# Patient Record
Sex: MALE | Race: WHITE
[De-identification: names, ages, dates, MRNs, and addresses within clinical notes are randomized per-mention and may not be internally consistent; named-entity substitution may affect disease eponyms.]

---

## 2018-11-05 ENCOUNTER — HOSPITAL ENCOUNTER (EMERGENCY)
Dept: HOSPITAL 62 - ER | Age: 33
Discharge: LEFT BEFORE DISCHARGE | End: 2018-11-05
Payer: SELF-PAY

## 2018-11-05 DIAGNOSIS — Z53.21: Primary | ICD-10-CM

## 2018-11-05 DIAGNOSIS — R07.81: ICD-10-CM

## 2018-11-07 ENCOUNTER — HOSPITAL ENCOUNTER (EMERGENCY)
Dept: HOSPITAL 62 - ER | Age: 33
Discharge: HOME | End: 2018-11-07
Payer: SELF-PAY

## 2018-11-07 VITALS — DIASTOLIC BLOOD PRESSURE: 68 MMHG | SYSTOLIC BLOOD PRESSURE: 110 MMHG

## 2018-11-07 DIAGNOSIS — R09.89: ICD-10-CM
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
## 2018-11-07 DIAGNOSIS — Z88.0: ICD-10-CM

## 2018-11-07 DIAGNOSIS — Z88.5: ICD-10-CM

## 2018-11-07 DIAGNOSIS — R07.81: ICD-10-CM

## 2018-11-07 DIAGNOSIS — F17.210: ICD-10-CM

## 2018-11-07 DIAGNOSIS — S20.212A: Primary | ICD-10-CM

## 2018-11-07 PROCEDURE — 99283 EMERGENCY DEPT VISIT LOW MDM: CPT

## 2018-11-07 NOTE — RADIOLOGY REPORT (SQ)
EXAM DESCRIPTION:  RIBS LEFT W/PA CHEST



COMPLETED DATE/TIME:  11/7/2018 10:50 am



REASON FOR STUDY:  Left rib injury



COMPARISON:  Chest x-ray dated 5/18/2016



TECHNIQUE:  Frontal view of the chest and additional views of the left ribs acquired.



NUMBER OF VIEWS:  Five view.



LIMITATIONS:  None.



FINDINGS:  FRONTAL CXR: No pneumothorax.  No pleural effusion.  No atelectasis or infiltrates.

RIBS: No displaced rib fractures.  No lytic or blastic bony lesions.

OTHER: No other significant finding.



IMPRESSION:  NO PNEUMOTHORAX.  NO DISPLACED RIB FRACTURES.



COMMENT:  SITE OF TRAUMA/COMPLAINT MARKED/STAMP COMPLETED: No



TECHNICAL DOCUMENTATION:  JOB ID:  [id number]

 2011 [employer]- All Rights Reserved



Reading location - IP/workstation name: [email or web address]

## 2018-11-07 NOTE — ER DOCUMENT REPORT
ED General





<JOE AGLLEGOS - Last Filed: 11/07/18 11:10>





- General


Mode of Arrival: Ambulatory


Information source: Patient


TRAVEL OUTSIDE OF THE [place] IN LAST 30 DAYS: No





<KAY SAENZ - Last Filed: 11/07/18 16:32>





- General


Chief Complaint: Rib Pain


Stated Complaint: RIB PAIN


Time Seen by Provider: 11/07/18 10:02


Notes: 


Patient is a 33 year old male with epilepsy presents to the emergency 

department complaining of left rib pain onset 3 days ago. Patient states he was 

playing soccer 3 nights ago when he collided with a  goalie and proceeded to 

have left rib pain. Patient states he presented to the emergency department 2 

days ago complaining of the same pain but left without being seen. He states 

the pain has significantly worsened this morning and is exacerbated with deep 

breathing. Patient states he has been taking Ibuprofen 800mg every 4-6 hours. 


 (KAY SAENZ)





- Related Data


Allergies/Adverse Reactions: 


 





amoxicillin [Amoxicillin] Allergy (Severe, Verified 11/07/18 09:37)


 


morphine [Morphine] Allergy (Severe, Verified 11/07/18 09:37)


 











Past Medical History





- General


Information source: Patient





- Social History


Smoking Status: Current Every Day Smoker


Cigarette use (# per day): Yes - 1/2 PPD


Chew tobacco use (# tins/day): No


Smoking Education Provided: No


Frequency of alcohol use: Rare


Family History: CAD, CVA, DM, Hyperlipidemia, Hypertension, Thyroid Disfunction


Neurological Medical History: Reports: Hx Seizures


Past Surgical History: Reports: Hx Abdominal Surgery - intestinal surgery as 

child, Hx Kidney (Renal Surgery)





- Immunizations


Immunizations up to date: Yes


Hx Diphtheria, Pertussis, Tetanus Vaccination: Yes





<KAY SAENZ - Last Filed: 11/07/18 16:32>





Review of Systems





- Review of Systems


Constitutional: No symptoms reported


EENT: No symptoms reported


Cardiovascular: No symptoms reported


Respiratory: No symptoms reported


Gastrointestinal: No symptoms reported


Genitourinary: No symptoms reported


Male Genitourinary: No symptoms reported


Musculoskeletal: See HPI


Skin: No symptoms reported


Hematologic/Lymphatic: No symptoms reported


Neurological/Psychological: No symptoms reported


-: Yes All other systems reviewed and negative





<KAY SAENZ - Last Filed: 11/07/18 16:32>





Physical Exam





<JOE GALLEGOS - Last Filed: 11/07/18 11:10>





<KAY SAENZ - Last Filed: 11/07/18 16:32>





- Vital signs


Vitals: 


 











Temp Pulse Resp BP Pulse Ox


 


 97.7 F   74   18   117/80   97 


 


 11/07/18 09:48  11/07/18 09:48  11/07/18 09:48  11/07/18 09:48  11/07/18 09:48














- Notes


Notes: 


GENERAL: Alert, interacts well. No acute distress.


HEAD: Normocephalic, atraumatic.


EYES: Pupils equal, round, and reactive to light. Extraocular movements intact.


ENT: Oral mucosa moist, tongue midline.


NECK: Full range of motion. Supple. Trachea midline.


LUNGS: Coarse breath sounds, rhonchi when coughing. Left anterior lateral 

inferior ribs tender to palpation, no bruising, no crepitance. No respiratory 

distress. 


HEART: Regular rate and rhythm. No murmurs, gallops, or rubs.


ABDOMEN: Soft, non-tender. Non-distended. Bowel sounds present in all 4 

quadrants.


EXTREMITIES: Moves all 4 extremities spontaneously. 


NEUROLOGICAL: Alert and oriented x3. Normal speech. 


PSYCH: Normal affect, normal mood.


SKIN: Warm, dry, normal turgor. No rashes or lesions noted.





 (KAY SAENZ)





Course





- Diagnostic Test


Radiology reviewed: Image reviewed, Reports reviewed - Left rib films do not 

show fractures or pulmonary issues.





<JOE GALLEGOS - Last Filed: 11/07/18 11:10>





- Vital Signs


Vital signs: 


 











Temp Pulse Resp BP Pulse Ox


 


 97.4 F   58 L  17   110/68   98 


 


 11/07/18 11:30  11/07/18 11:30  11/07/18 11:30  11/07/18 11:30  11/07/18 11:30














Discharge





<JOE GALLEGOS - Last Filed: 11/07/18 11:10>





<KAY SAENZ - Last Filed: 11/07/18 16:32>





- Discharge


Clinical Impression: 


Contusion of rib on left side


Qualifiers:


 Encounter type: initial encounter Qualified Code(s): S20.212A - Contusion of 

left front wall of thorax, initial encounter





Condition: Stable


Disposition: HOME, SELF-CARE


Additional Instructions: 


Rib Injuries:





     You have been diagnosed as having bruised ribs.  Bruised ribs and 

fractured ribs are treated in the same way.  It will usually take four to six 

weeks for these injured ribs to heal.


     You should cough or take a deep breath at least every hour or two to 

prevent lung complications.


     You should not engage in any strenuous physical activity until released by 

your physician.  The usual rule is "if it hurts, don't do it."


     You should call your physician or return at once if any of the following 

occur: 


(1) Fever or chills. 


(2) Persistent cough, coughing up blood, or shortness of breath. 


(3) Increasing pain. 


(4) Weakness, lightheadedness, or fainting.








********************************************************************************

**************





Take ibuprofen 800 mg with food every 8 hours.


Moist heat or ice packs to the painful chest wall may help with some of your 

discomfort.


Take the pain medication as prescribed at bedtime to help with sleep.


You will be uncomfortable during the day, this is part of the healing process 

after a rib injury.


Try to avoid activities that make the ribs hurt more.


You may return to work when you are able to do the required activities without 

significant pain or discomfort.








RETURN TO THE EMERGENCY ROOM IF ANY NEW OR WORSENING SYMPTOMS.








Prescriptions: 


Oxycodone HCl/Acetaminophen [Percocet 5-325 mg Tablet] 1 - 2 tab PO QHS PRN #10 

tablet


 PRN Reason: For Pain


Scribe Attestation: 





11/07/18 10:27


I personally performed the services described in the documentation, reviewed 

and edited the documentation which was dictated to the scribe in my presence, 

and it accurately records my words and actions. (JOE GALLEGOS)





Scribe Documentation





- Scribe


Written by Loreto:: Loreto Dong, 11/7/2018 10:24


acting as scribe for :: Gabi





<KAY SAENZ - Last Filed: 11/07/18 16:32>